# Patient Record
Sex: MALE | Race: WHITE
[De-identification: names, ages, dates, MRNs, and addresses within clinical notes are randomized per-mention and may not be internally consistent; named-entity substitution may affect disease eponyms.]

---

## 2020-07-05 ENCOUNTER — HOSPITAL ENCOUNTER (EMERGENCY)
Dept: HOSPITAL 26 - MED | Age: 30
Discharge: HOME | End: 2020-07-05
Payer: MEDICAID

## 2020-07-05 VITALS — SYSTOLIC BLOOD PRESSURE: 162 MMHG | DIASTOLIC BLOOD PRESSURE: 90 MMHG

## 2020-07-05 VITALS — DIASTOLIC BLOOD PRESSURE: 90 MMHG | SYSTOLIC BLOOD PRESSURE: 162 MMHG

## 2020-07-05 VITALS — WEIGHT: 201 LBS | HEIGHT: 66 IN | BODY MASS INDEX: 32.3 KG/M2

## 2020-07-05 DIAGNOSIS — F17.210: ICD-10-CM

## 2020-07-05 DIAGNOSIS — R00.2: Primary | ICD-10-CM

## 2020-07-05 NOTE — NUR
31 y/o male c/o of chest pain x 8hrs. rates pain 0/10 and denies any pain at 
this moment but prior to er visit he describes it as pressure feeling and it is 
nonradiating. lung sounds clear all throughout. heart sounds s1s2 present. no 
edema, sob, n,v, or fever present. vss. a&ox4. steady gait. denies taking any 
pain meds. pt skin is warm to touch.



nka.

no pmh.

## 2022-06-05 ENCOUNTER — HOSPITAL ENCOUNTER (EMERGENCY)
Dept: HOSPITAL 26 - MED | Age: 32
Discharge: HOME | End: 2022-06-05
Payer: COMMERCIAL

## 2022-06-05 VITALS — HEIGHT: 66 IN | BODY MASS INDEX: 30.53 KG/M2 | WEIGHT: 190 LBS

## 2022-06-05 VITALS — DIASTOLIC BLOOD PRESSURE: 68 MMHG | SYSTOLIC BLOOD PRESSURE: 102 MMHG

## 2022-06-05 VITALS — SYSTOLIC BLOOD PRESSURE: 102 MMHG | DIASTOLIC BLOOD PRESSURE: 68 MMHG

## 2022-06-05 DIAGNOSIS — S51.811A: Primary | ICD-10-CM
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
## 2022-06-05 PROCEDURE — 12004 RPR S/N/AX/GEN/TRK7.6-12.5CM: CPT

## 2022-06-05 PROCEDURE — 90715 TDAP VACCINE 7 YRS/> IM: CPT

## 2022-06-05 PROCEDURE — 99283 EMERGENCY DEPT VISIT LOW MDM: CPT

## 2022-06-05 PROCEDURE — 90471 IMMUNIZATION ADMIN: CPT

## 2022-06-17 ENCOUNTER — HOSPITAL ENCOUNTER (EMERGENCY)
Dept: HOSPITAL 26 - MED | Age: 32
Discharge: HOME | End: 2022-06-17
Payer: COMMERCIAL

## 2022-06-17 VITALS — HEIGHT: 66 IN | BODY MASS INDEX: 30.53 KG/M2 | WEIGHT: 190 LBS

## 2022-06-17 VITALS — SYSTOLIC BLOOD PRESSURE: 129 MMHG | DIASTOLIC BLOOD PRESSURE: 66 MMHG

## 2022-06-17 VITALS — SYSTOLIC BLOOD PRESSURE: 126 MMHG | DIASTOLIC BLOOD PRESSURE: 70 MMHG
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
## 2022-06-17 DIAGNOSIS — Z48.02: ICD-10-CM

## 2022-06-17 DIAGNOSIS — S51.811D: Primary | ICD-10-CM

## 2022-06-17 NOTE — NUR
Patient discharged with v/s stable. Written and verbal after care instructions 
given and explained. 

Patient verbalized understanding. Ambulatory with steady gait. All questions 
addressed prior to discharge. Advised to follow up with PMD. VSS, A/OX4, 
UNLABORED BREATHING, AMBULATORY, AND CALM DEMEANOR.

## 2022-06-17 NOTE — NUR
31 Y/O MALE BIBS FROM HOME. C/O SUTURE REMOVAL. PT HAD STITCHES PLACE 12 DAYS 
AGO FOR LAC TO RIGHT FOREARM. PT DENIES PAIN OR FEVER. UNLABORED BREATHING; 
A/OX4, GCS-15; AMBULATORY.



DENIES PMH/RX

NO MEDS